# Patient Record
Sex: MALE | Race: WHITE | NOT HISPANIC OR LATINO | Employment: FULL TIME | URBAN - NONMETROPOLITAN AREA
[De-identification: names, ages, dates, MRNs, and addresses within clinical notes are randomized per-mention and may not be internally consistent; named-entity substitution may affect disease eponyms.]

---

## 2019-05-29 ENCOUNTER — OFFICE VISIT (OUTPATIENT)
Dept: URGENT CARE | Facility: CLINIC | Age: 29
End: 2019-05-29
Payer: COMMERCIAL

## 2019-05-29 VITALS
HEIGHT: 65 IN | HEART RATE: 77 BPM | SYSTOLIC BLOOD PRESSURE: 120 MMHG | TEMPERATURE: 98.4 F | OXYGEN SATURATION: 97 % | DIASTOLIC BLOOD PRESSURE: 86 MMHG | RESPIRATION RATE: 12 BRPM | WEIGHT: 156 LBS | BODY MASS INDEX: 25.99 KG/M2

## 2019-05-29 DIAGNOSIS — T14.8XXA WOUND INFECTION: ICD-10-CM

## 2019-05-29 DIAGNOSIS — L08.9 WOUND INFECTION: ICD-10-CM

## 2019-05-29 PROCEDURE — 99203 OFFICE O/P NEW LOW 30 MIN: CPT | Performed by: PHYSICIAN ASSISTANT

## 2019-05-29 RX ORDER — MUPIROCIN CALCIUM 20 MG/G
CREAM TOPICAL
Qty: 30 G | Refills: 0 | Status: SHIPPED | OUTPATIENT
Start: 2019-05-29

## 2019-05-29 ASSESSMENT — ENCOUNTER SYMPTOMS
CHILLS: 0
TINGLING: 0
SHORTNESS OF BREATH: 0
NAUSEA: 0
NUMBNESS: 0
FEVER: 0
CONSTIPATION: 0
DIARRHEA: 0
VOMITING: 0
SENSORY CHANGE: 0
ABDOMINAL PAIN: 0
MYALGIAS: 0

## 2019-05-29 NOTE — PROGRESS NOTES
"Subjective:   Sage Scott is a 28 y.o. male who presents for Abrasion (infected scratche on right fore arm)       Abrasion   This is a new problem. The current episode started yesterday. The problem occurs constantly. The problem has been unchanged. Pertinent negatives include no abdominal pain, chest pain, chills, fever, myalgias, nausea, numbness or vomiting. Exacerbated by: palpation. He has tried nothing for the symptoms. The treatment provided no relief.     Review of Systems   Constitutional: Negative for chills and fever.   Respiratory: Negative for shortness of breath.    Cardiovascular: Negative for chest pain.   Gastrointestinal: Negative for abdominal pain, constipation, diarrhea, nausea and vomiting.   Musculoskeletal: Negative for myalgias.   Skin:        Positive for abrasion right forearm   Neurological: Negative for tingling, sensory change and numbness.   All other systems reviewed and are negative.      PMH:  has no past medical history on file.    MEDS:   Current Outpatient Prescriptions:   •  mupirocin calcium (BACTROBAN) 2 % Cream, Apply to affected area three times daily for 10 days, Disp: 30 g, Rfl: 0    ALLERGIES: No Known Allergies    SURGHX: History reviewed. No pertinent surgical history.    SOCHX:  reports that he has been smoking.  He has never used smokeless tobacco. He reports that he drinks alcohol. He reports that he uses drugs.    FH: Reviewed with patient, not pertinent to this visit.     Objective:   /86 (BP Location: Right arm, Patient Position: Sitting)   Pulse 77   Temp 36.9 °C (98.4 °F)   Resp 12   Ht 1.651 m (5' 5\")   Wt 70.8 kg (156 lb)   SpO2 97%   BMI 25.96 kg/m²   Physical Exam   Constitutional: He is oriented to person, place, and time. He appears well-developed and well-nourished. No distress.   HENT:   Head: Normocephalic and atraumatic.   Nose: Nose normal.   Eyes: Conjunctivae and EOM are normal.   Neck: Normal range of motion. No tracheal " deviation present.   Cardiovascular: Normal rate and regular rhythm.    Pulses:       Radial pulses are 2+ on the right side, and 2+ on the left side.   Pulmonary/Chest: Effort normal. No respiratory distress.   Musculoskeletal:   ROM normal all four extremities   Neurological: He is alert and oriented to person, place, and time.   Skin: Skin is warm and dry. Capillary refill takes less than 2 seconds. Abrasion (Superficial abrasion right forearm. Erythema and warmth present. ) noted.        Psychiatric: He has a normal mood and affect. His behavior is normal. Judgment and thought content normal.   Vitals reviewed.      Assessment/Plan:   1. Wound infection  - mupirocin calcium (BACTROBAN) 2 % Cream; Apply to affected area three times daily for 10 days  Dispense: 30 g; Refill: 0    - Advised to keep area clean and dry  - Advised on proper wound care  - Advised to return if symptoms worsen or do not improve    Differential diagnosis, natural history, supportive care, and indications for immediate follow-up discussed.